# Patient Record
Sex: MALE
[De-identification: names, ages, dates, MRNs, and addresses within clinical notes are randomized per-mention and may not be internally consistent; named-entity substitution may affect disease eponyms.]

---

## 2018-01-23 ENCOUNTER — TRANSCRIPTION ENCOUNTER (OUTPATIENT)
Age: 33
End: 2018-01-23

## 2020-11-13 ENCOUNTER — APPOINTMENT (OUTPATIENT)
Dept: ORTHOPEDIC SURGERY | Facility: CLINIC | Age: 35
End: 2020-11-13
Payer: COMMERCIAL

## 2020-11-13 VITALS
SYSTOLIC BLOOD PRESSURE: 127 MMHG | BODY MASS INDEX: 29.39 KG/M2 | WEIGHT: 217 LBS | HEART RATE: 64 BPM | DIASTOLIC BLOOD PRESSURE: 86 MMHG | HEIGHT: 72 IN

## 2020-11-13 PROCEDURE — 20552 NJX 1/MLT TRIGGER POINT 1/2: CPT | Mod: LT

## 2020-11-13 PROCEDURE — 99072 ADDL SUPL MATRL&STAF TM PHE: CPT

## 2020-11-13 PROCEDURE — 72100 X-RAY EXAM L-S SPINE 2/3 VWS: CPT

## 2020-11-13 PROCEDURE — 99204 OFFICE O/P NEW MOD 45 MIN: CPT | Mod: 25

## 2020-11-13 NOTE — ADDENDUM
[FreeTextEntry1] : Documented by Bassam Arechiga acting as a scribe for Dr. Fredy Erwin on 11/13/2020. All medical record entries made by the Scribe were at my, Dr. Fredy Erwin, direction and personally dictated by me on 11/13/2020 . I have reviewed the chart and agree that the record accurately reflects my personal performance of the history, physical exam, assessment and plan. I have also personally directed, reviewed, and agreed with the chart.

## 2020-11-13 NOTE — HISTORY OF PRESENT ILLNESS
[de-identified] : 35 year M presents for an initial evaluation of one month of left lower lumbar pain that began when he started running with a weighted vest. The pain when running is described as a burning sensation. He has recently used a Medrol dose pack and states he is improving. Prior to taking the Medrol pack he notes the pain traveled down his legs and his toes were getting numb.  [Ataxia] : no ataxia [Incontinence] : no incontinence [Loss of Dexterity] : good dexterity [Urinary Ret.] : no urinary retention

## 2020-11-13 NOTE — PHYSICAL EXAM
[Poor Appearance] : well-appearing [Acute Distress] : not in acute distress [Obese] : not obese [de-identified] : CONSTITUTIONAL: The patient is a very pleasant individual who is well-nourished and who appears stated age.\par PSYCHIATRIC: The patient is alert and oriented X 3 and in no apparent distress, and participates with orthopedic evaluation well.\par HEAD: Atraumatic and is nonsyndromic in appearance.\par EENT: No visible thyromegaly, EOMI.\par RESPIRATORY: Respiratory rate is regular, not dyspneic on examination.\par LYMPHATICS: There is no inguinal lymphadenopathy\par INTEGUMENTARY: Skin is clean, dry, and intact about the bilateral lower extremities and lumbar spine.\par VASCULAR: There is brisk capillary refill about the bilateral lower extremities.\par NEUROLOGIC: There are no pathologic reflexes. There is no decrease in sensation of the bilateral lower extremities on Wartenberg pinwheel examination. Deep tendon reflexes are well maintained at 2+/4 of the bilateral lower extremities and are symmetric.\par MUSCULOSKELETAL: There is no visible muscular atrophy. Manual motor strength is well maintained in the bilateral lower extremities. Range of motion of lumbar spine is well maintained. The patient ambulates in a non-myelopathic manner. Negative tension sign and straight leg raise bilaterally. Quad extension, ankle dorsiflexion, EHL, plantar flexion, and ankle eversion are well preserved. Normal secondary orthopaedic exam of bilateral hips, greater trochanteric area, knees and ankles. Isolated left lower lumbar myositis / trigger point.  [de-identified] : Xray of the lumbar spine taken 11/13/2020 demonstrates focal L5-S1 lumbar degenerative disc disease, patient is S/p left sided L5-S1 Diskectomy done 03/25/2011.

## 2020-11-13 NOTE — DISCUSSION/SUMMARY
[de-identified] : Patient tolerated a trigger point injection well. I will provide a Medrol dose pack for pain relief. With regard to running I recommend lowering the milage and extensive stretching prior. We also spoke about the benefits of using heat, ice, and Bengay cream. The patient will follow up in 1 month for a repeat clinical assessment, If pain persists at this point patient can follow up as needed.

## 2020-11-13 NOTE — PROCEDURE
[de-identified] : Verbal consent was obtained and all questions, comments and concerns were addressed. After the left lower lumbar trigger point in the affected area was cleansed with alcohol prep X 3, ethyl chloride was used as local anesthetic. Under sterile precautions 1cc of 1% lidocaine without epinephrine, plus 10 mg depomedrol, were instilled into the affected left lower lumbar trigger points. Patient tolerated procedure well. Dry sterile dressing was placed and patient described relief of pain 5 minutes after procedure was performed.

## 2020-12-17 ENCOUNTER — APPOINTMENT (OUTPATIENT)
Dept: ORTHOPEDIC SURGERY | Facility: CLINIC | Age: 35
End: 2020-12-17
Payer: COMMERCIAL

## 2020-12-17 VITALS
BODY MASS INDEX: 29.39 KG/M2 | HEART RATE: 62 BPM | SYSTOLIC BLOOD PRESSURE: 126 MMHG | DIASTOLIC BLOOD PRESSURE: 78 MMHG | HEIGHT: 72 IN | WEIGHT: 217 LBS

## 2020-12-17 VITALS — TEMPERATURE: 96.9 F

## 2020-12-17 DIAGNOSIS — Z78.9 OTHER SPECIFIED HEALTH STATUS: ICD-10-CM

## 2020-12-17 PROCEDURE — 99214 OFFICE O/P EST MOD 30 MIN: CPT

## 2020-12-17 PROCEDURE — 99072 ADDL SUPL MATRL&STAF TM PHE: CPT

## 2020-12-17 NOTE — HISTORY OF PRESENT ILLNESS
[de-identified] : 35 year M presents for an initial evaluation of one month of left lower lumbar pain that began when he started running with a weighted vest. The pain when running is described as a burning sensation. He has recently used a Medrol dose pack and states he is improving. Prior to taking the Medrol pack he notes the pain traveled down his legs and his toes were getting numb. He has been Taking,doing a physician guided program for the last 4 weeks since his last visit on November 18, 2020. He is concerned that his pain across his low back and right buttock continues.
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
## 2020-12-17 NOTE — PHYSICAL EXAM
[de-identified] : CONSTITUTIONAL: The patient is a very pleasant individual who is well-nourished and who appears stated age.\par PSYCHIATRIC: The patient is alert and oriented X 3 and in no apparent distress, and participates with orthopedic evaluation well.\par HEAD: Atraumatic and is nonsyndromic in appearance.\par EENT: No visible thyromegaly, EOMI.\par RESPIRATORY: Respiratory rate is regular, not dyspneic on examination.\par LYMPHATICS: There is no inguinal lymphadenopathy\par INTEGUMENTARY: Skin is clean, dry, and intact about the bilateral lower extremities and lumbar spine.\par VASCULAR: There is brisk capillary refill about the bilateral lower extremities.\par NEUROLOGIC: There are no pathologic reflexes. There is no decrease in sensation of the bilateral lower extremities on Wartenberg pinwheel examination. Deep tendon reflexes are well maintained at 2+/4 of the bilateral lower extremities and are symmetric.\par MUSCULOSKELETAL: There is no visible muscular atrophy. Manual motor strength is well maintained in the bilateral lower extremities. Range of motion of lumbar spine is well maintained. The patient ambulates in a non-myelopathic manner. Negative tension sign and straight leg raise bilaterally. Quad extension, ankle dorsiflexion, EHL, plantar flexion, and ankle eversion are well preserved. Normal secondary orthopaedic exam of bilateral hips, greater trochanteric area, knees and ankles. Isolated left lower lumbar myositis / trigger point. \par  [de-identified] : no new imaging\par re reviewed lumbar xray last visit demonstrating DDD L5-S1

## 2021-01-02 ENCOUNTER — APPOINTMENT (OUTPATIENT)
Dept: MRI IMAGING | Facility: CLINIC | Age: 36
End: 2021-01-02
Payer: COMMERCIAL

## 2021-01-02 ENCOUNTER — OUTPATIENT (OUTPATIENT)
Dept: OUTPATIENT SERVICES | Facility: HOSPITAL | Age: 36
LOS: 1 days | End: 2021-01-02
Payer: COMMERCIAL

## 2021-01-02 DIAGNOSIS — M47.816 SPONDYLOSIS WITHOUT MYELOPATHY OR RADICULOPATHY, LUMBAR REGION: ICD-10-CM

## 2021-01-02 PROCEDURE — 72148 MRI LUMBAR SPINE W/O DYE: CPT

## 2021-01-02 PROCEDURE — 72148 MRI LUMBAR SPINE W/O DYE: CPT | Mod: 26

## 2021-01-12 ENCOUNTER — APPOINTMENT (OUTPATIENT)
Dept: ORTHOPEDIC SURGERY | Facility: CLINIC | Age: 36
End: 2021-01-12
Payer: COMMERCIAL

## 2021-01-12 VITALS
DIASTOLIC BLOOD PRESSURE: 78 MMHG | SYSTOLIC BLOOD PRESSURE: 126 MMHG | BODY MASS INDEX: 29.39 KG/M2 | HEART RATE: 66 BPM | WEIGHT: 217 LBS | HEIGHT: 72 IN

## 2021-01-12 DIAGNOSIS — M60.9 MYOSITIS, UNSPECIFIED: ICD-10-CM

## 2021-01-12 PROCEDURE — 99072 ADDL SUPL MATRL&STAF TM PHE: CPT

## 2021-01-12 PROCEDURE — 99214 OFFICE O/P EST MOD 30 MIN: CPT

## 2021-01-12 NOTE — PHYSICAL EXAM
[Poor Appearance] : well-appearing [Acute Distress] : not in acute distress [de-identified] : CONSTITUTIONAL: The patient is a very pleasant individual who is well-nourished and who appears stated age.\par PSYCHIATRIC: The patient is alert and oriented X 3 and in no apparent distress, and participates with orthopedic evaluation well.\par HEAD: Atraumatic and is nonsyndromic in appearance.\par EENT: No visible thyromegaly, EOMI.\par RESPIRATORY: Respiratory rate is regular, not dyspneic on examination.\par LYMPHATICS: There is no inguinal lymphadenopathy\par INTEGUMENTARY: Skin is clean, dry, and intact about the bilateral lower extremities and lumbar spine.\par VASCULAR: There is brisk capillary refill about the bilateral lower extremities.\par NEUROLOGIC: There are no pathologic reflexes. There is no decrease in sensation of the bilateral lower extremities on Wartenberg pinwheel examination. Deep tendon reflexes are well maintained at 2+/4 of the bilateral lower extremities and are symmetric.\par MUSCULOSKELETAL: There is no visible muscular atrophy. Manual motor strength is well maintained in the bilateral lower extremities. Range of motion of lumbar spine is well maintained. The patient ambulates in a non-myelopathic manner. Negative tension sign and straight leg raise bilaterally. Quad extension, ankle dorsiflexion, EHL, plantar flexion, and ankle eversion are well preserved. Normal secondary orthopaedic exam of bilateral hips, greater trochanteric area, knees and ankles. Isolated left lower lumbar myositis / trigger point. \par  [de-identified] : Lumbar MRI done in [place] while held radiology January 2021 demonstrates annulus tear L4-L5 with disc herniation paracentral to the left somewhat impinging the L5 nerve degenerative disc disease L5-S1.

## 2021-01-12 NOTE — HISTORY OF PRESENT ILLNESS
[de-identified] : 35 year M presents for a follow up evaluation of one month of left lower lumbar pain that began when he started running with a weighted vest. The pain when running is described as a burning sensation. He has recently used three Medrol dose pack and states he is no longer improving. Prior to taking the Medrol pack he notes the pain traveled down his left legs and his toes were getting numb. He has been Taking,doing a physician guided program for the last 4 weeks since his last visit on November 18, 2020. He is concerned that his pain across his low back and right buttock continues.  Here for MRI review. [Ataxia] : no ataxia [Incontinence] : no incontinence [Loss of Dexterity] : good dexterity [Urinary Ret.] : no urinary retention

## 2021-01-12 NOTE — DISCUSSION/SUMMARY
[de-identified] : Patient has done 3 Medrol Dosepaks without any effects I discouraged any further use of oral steroids as there can be side effects. He is referred to pain management/physiatry for injection therapy. We had a long discussion about the significance in course of degenerative disc disease in every opportunity was given to answer questions comments or concerns. Intermittent uses anti-inflammatories, ice, heat, topicals as needed.  Low impact aerobic activity and weight training was discussed at length as well. Followup  After one or 2 injections

## 2021-01-14 ENCOUNTER — APPOINTMENT (OUTPATIENT)
Dept: PHYSICAL MEDICINE AND REHAB | Facility: CLINIC | Age: 36
End: 2021-01-14
Payer: COMMERCIAL

## 2021-01-14 VITALS
SYSTOLIC BLOOD PRESSURE: 136 MMHG | HEIGHT: 72 IN | WEIGHT: 217 LBS | DIASTOLIC BLOOD PRESSURE: 87 MMHG | HEART RATE: 59 BPM | BODY MASS INDEX: 29.39 KG/M2

## 2021-01-14 VITALS — TEMPERATURE: 96.2 F

## 2021-01-14 DIAGNOSIS — M47.816 SPONDYLOSIS W/OUT MYELOPATHY OR RADICULOPATHY, LUMBAR REGION: ICD-10-CM

## 2021-01-14 PROCEDURE — 99072 ADDL SUPL MATRL&STAF TM PHE: CPT

## 2021-01-14 PROCEDURE — 99203 OFFICE O/P NEW LOW 30 MIN: CPT

## 2021-01-14 NOTE — HISTORY OF PRESENT ILLNESS
[FreeTextEntry1] : Mr. GUILLERMO FELTON is a very pleasant 35 year male PSH lumbar microdiscectomy L4/L5, L5/S1 in 2011 (good relief afterwards) who comes in for evaluation of LBP that has been ongoing for 2009 without any specific injury or inciting event. Had flare up in 2013 and 2016 and went away with medrlo dose pack. Patient's pain pain at that time described radicular pain to the right leg in 2013 and radicular pain to the left leg in 2016.Patient has had flare up again in 2020 which he described the pain more localized to the hip and more soreness, with radicular pain in the left leg. Patient denies anything on the right leg for now. The pain is located primarily in the low back, left hip, left posterior thigh, and left foot. Intermittent in nature. The pain is rated as  4-5/10 during today's visit, and ranges from 4-9/10. The patient's symptoms are aggravated by standing prolonged and running and alleviated by rest, CBD lotion after his last Medrol dose pack in [place] week (has had 3 medrol dose pack in the past year) . The patient endorses to occasional numbness and tingling on his left toes and lateral side of his foot. Denies any night pain, weakness, or bowel/bladder dysfunction. The patient has no other complaints at this time. Patient had PT in the past 2018 which he found to help temporarily. Also had chiropractic care and acupuncture in the past without much help. He has had epidural injections done 6 times total with the last one in 2013 with most recent ones not helping much. \par \par Patient saw Dr. Kidd recently where MRI image was reviewed showing L5/S1 DDD. Referred here today for epidural injection.

## 2021-01-14 NOTE — DATA REVIEWED
[MRI] : MRI [FreeTextEntry1] : MRI Lumbar spine 1/2/21:\par Moderate multilevel spondylosis most significant at L4-5 and L5-S1.

## 2021-01-14 NOTE — ASSESSMENT
[FreeTextEntry1] : 35 y.o. M w/ remote h/o L4-5 + L5-S1 MLD (2011) now with advanced lumbar DDD (worse L5-S1) w/ Modic II changes and chronic, intermittent left S1 radiculopathy w/ reflex loss.  New MRI L-spine w/o discussed in detail with patient.   Rx P.T. for modalities, gentle ROM, stretching and strengthening exercises.  Advised weight loss and walking in pool for core stability.  Discussed R/B/A to LESI but deferred as patient is at increased risk of cumulative steroid toxicity 2' recent medrol dose pack x3.  May take short courses of PO NSAIDs but cautioned on chronic use 2' GI/cardiac/renal toxicities.  RTC in 2-3 months.

## 2021-01-14 NOTE — REVIEW OF SYSTEMS
[Negative] : Psychiatric [FreeTextEntry9] : +LBP  [de-identified] : +Numbness and tingling on the left foot

## 2021-01-14 NOTE — PHYSICAL EXAM
[FreeTextEntry1] : LUMBAR EXAM\par GEN: AAOx3, NAD\par SKIN: No lesions noted \par STRENGTH: 5/5 bilateral hip flexors, knee extensors, knee flexors, ankle dorsiflexors, long toe extensors, ankle plantar flexors. \par TONE: Normal, No clonus.\par REFLEXES: 2+ symmetric patella, 2+ right achilles. Absent left ankle jerk \par SENS: Grossly intact to light touch bilateral lower extremities \par INSP: Spine alignment is midline, with no evidence of scoliosis. \par GAIT: Non antalgic, normal reciprocating heel to toe \par LUMBAR ROM: 80 degree Flexion, extension 20', side-bending, rotation all full and pain free. \par HIP ROM: Full and pain free bilaterally \par PALP: There is no tenderness over the midline spinous processes, paravertebral muscles, SIJ, or greater trochanters bilaterally. \par SPECIAL: SLR test negative bilaterally. FADIR, JUDY negative bilaterally.\par \par

## 2021-01-26 ENCOUNTER — TRANSCRIPTION ENCOUNTER (OUTPATIENT)
Age: 36
End: 2021-01-26

## 2021-03-16 ENCOUNTER — APPOINTMENT (OUTPATIENT)
Dept: PHYSICAL MEDICINE AND REHAB | Facility: CLINIC | Age: 36
End: 2021-03-16
Payer: COMMERCIAL

## 2021-03-16 VITALS
DIASTOLIC BLOOD PRESSURE: 85 MMHG | WEIGHT: 225 LBS | RESPIRATION RATE: 14 BRPM | HEART RATE: 51 BPM | BODY MASS INDEX: 30.48 KG/M2 | OXYGEN SATURATION: 100 % | SYSTOLIC BLOOD PRESSURE: 129 MMHG | TEMPERATURE: 97 F | HEIGHT: 72 IN

## 2021-03-16 PROCEDURE — 99072 ADDL SUPL MATRL&STAF TM PHE: CPT

## 2021-03-16 PROCEDURE — 99212 OFFICE O/P EST SF 10 MIN: CPT

## 2021-03-16 RX ORDER — DICLOFENAC SODIUM 75 MG/1
75 TABLET, DELAYED RELEASE ORAL
Refills: 0 | Status: DISCONTINUED | COMMUNITY
End: 2021-03-16

## 2021-03-16 RX ORDER — METHYLPREDNISOLONE 4 MG/1
4 TABLET ORAL
Refills: 0 | Status: DISCONTINUED | COMMUNITY
End: 2021-03-16

## 2021-03-16 RX ORDER — DICLOFENAC SODIUM 75 MG/1
75 TABLET, DELAYED RELEASE ORAL
Qty: 30 | Refills: 0 | Status: DISCONTINUED | COMMUNITY
Start: 2020-12-11 | End: 2021-03-16

## 2021-03-16 RX ORDER — METHYLPREDNISOLONE 4 MG/1
4 TABLET ORAL
Qty: 1 | Refills: 0 | Status: DISCONTINUED | COMMUNITY
Start: 2020-11-13 | End: 2021-03-16

## 2021-03-16 NOTE — ASSESSMENT
[FreeTextEntry1] : 35 y.o. M w/ remote h/o L4-5 + L5-S1 MLD (2011) now with advanced lumbar DDD (worse L5-S1) w/ Modic II changes and chronic, intermittent left S1 radiculopathy w/ reflex loss - doing well clinically.   No need for lumbar spinal intervention at this point.   Reviewed proper HEP and counseled patient on proper running technique.  RTC 6 months or sooner should need arise.

## 2021-03-16 NOTE — PHYSICAL EXAM
[FreeTextEntry1] : LUMBAR EXAM\par GEN: AAOx3, NAD\par No significant change in today's examination\par STRENGTH: 5/5 bilateral hip flexors, knee extensors, knee flexors, ankle dorsiflexors, long toe extensors, ankle plantar flexors. \par TONE: Normal, No clonus.\par REFLEXES: 2+ symmetric patella, 2+ right Achilles. Absent left ankle jerk (static)\par SENS: Grossly intact to light touch bilateral lower extremities \par INSP: Spine alignment is midline, with no evidence of scoliosis. \par GAIT: Non antalgic, normal reciprocating heel to toe \par LUMBAR ROM: 80 degree Flexion, extension 20', side-bending, rotation all full and pain free. \par HIP ROM: Full and pain free bilaterally \par PALP: There is no tenderness over the midline spinous processes, paravertebral muscles, SIJ, or greater trochanters bilaterally. \par SPECIAL: SLR test negative bilaterally. FADIR, JUDY negative bilaterally.\par \par

## 2021-03-16 NOTE — HISTORY OF PRESENT ILLNESS
[FreeTextEntry1] : 35 y.o. M w/ remote h/o L4-5 + L5-S1 MLD (2011) now with advanced lumbar DDD (worse L5-S1) w/ Modic II changes and chronic, intermittent left S1 radiculopathy w/ reflex loss returns to office for f/u.  Pt. states that his LBP is "doing a lot better" since starting P.T..  Pain down left leg "almost down to zero".  Reports occasional left lateral hip/buttock pain which is responsive to stretching.  Pt. is compliant with HEP.  Not able to exercise in pool but started stationary bike and running.  No NSAIDs.

## 2021-09-07 ENCOUNTER — APPOINTMENT (OUTPATIENT)
Dept: PHYSICAL MEDICINE AND REHAB | Facility: CLINIC | Age: 36
End: 2021-09-07

## 2021-12-07 ENCOUNTER — APPOINTMENT (OUTPATIENT)
Dept: PHYSICAL MEDICINE AND REHAB | Facility: CLINIC | Age: 36
End: 2021-12-07
Payer: COMMERCIAL

## 2021-12-07 DIAGNOSIS — M51.36 OTHER INTERVERTEBRAL DISC DEGENERATION, LUMBAR REGION: ICD-10-CM

## 2021-12-07 DIAGNOSIS — Z98.890 OTHER SPECIFIED POSTPROCEDURAL STATES: ICD-10-CM

## 2021-12-07 PROCEDURE — 99442: CPT

## 2021-12-07 NOTE — HISTORY OF PRESENT ILLNESS
[FreeTextEntry1] : 36 y.o. M w/ remote h/o L4-5 + L5-S1 MLD (2011) now with advanced lumbar DDD (worse L5-S1) w/ Modic II changes and chronic, intermittent left S1 radiculopathy w/ reflex loss for telephonic f/u.  Since last office visit (3/16/21) pt. has moved to [place], [place].  Pt. states that his sciatica has responded well to P.T. in past.  Pt. is now c/o pain in both Achilles tendons since he started running more.  Pain is worse first thing in AM.  Gets better throughout day.  Denies N/T/B in feet.  Not taking any NSAIDs.  No injections.

## 2021-12-07 NOTE — REASON FOR VISIT
[Home] : at home, [unfilled] , at the time of the visit. [Medical Office: ([facility])___] : at the medical office located in  [Verbal consent obtained from patient] : the patient, [unfilled] [Follow-Up] : a follow-up visit

## 2021-12-07 NOTE — ASSESSMENT
[FreeTextEntry1] : 36 y.o. M w/ remote h/o L4-5 + L5-S1 MLD (2011) now with advanced lumbar DDD (worse L5-S1) w/ Modic II changes and chronic, intermittent left S1 radiculopathy w/ reflex loss - doing well clinically but w/ recent onset of b/l Achilles tendon pain.  I spent most of today's telephone visit (20 min) discussing pathogenesis and further non-operative management.  May use Voltaren gel 2-4 g to affected areas bid - tid prn.  Rx P.T. for modalities, gentle ROM, stretching and strengthening exercises.  Will perform diagnostic MSK US examination at next office visit.  No need for lumbar spinal intervention at this point.  Reviewed proper HEP and counseled patient on proper running technique.  RTC 1-2 weeks for US.

## 2022-01-07 ENCOUNTER — APPOINTMENT (OUTPATIENT)
Dept: PHYSICAL MEDICINE AND REHAB | Facility: CLINIC | Age: 37
End: 2022-01-07

## 2023-03-22 ENCOUNTER — APPOINTMENT (RX ONLY)
Dept: URBAN - METROPOLITAN AREA CLINIC 81 | Facility: CLINIC | Age: 38
Setting detail: DERMATOLOGY
End: 2023-03-22

## 2023-03-22 DIAGNOSIS — L82.0 INFLAMED SEBORRHEIC KERATOSIS: ICD-10-CM

## 2023-03-22 PROBLEM — D23.121 OTHER BENIGN NEOPLASM OF SKIN OF LEFT UPPER EYELID, INCLUDING CANTHUS: Status: ACTIVE | Noted: 2023-03-22

## 2023-03-22 PROBLEM — D23.111 OTHER BENIGN NEOPLASM OF SKIN OF RIGHT UPPER EYELID, INCLUDING CANTHUS: Status: ACTIVE | Noted: 2023-03-22

## 2023-03-22 PROCEDURE — ? LIQUID NITROGEN

## 2023-03-22 PROCEDURE — ? TREATMENT REGIMEN

## 2023-03-22 PROCEDURE — 99202 OFFICE O/P NEW SF 15 MIN: CPT | Mod: 25

## 2023-03-22 PROCEDURE — 17110 DESTRUCTION B9 LES UP TO 14: CPT

## 2023-03-22 PROCEDURE — ? COUNSELING

## 2023-03-22 ASSESSMENT — LOCATION DETAILED DESCRIPTION DERM
LOCATION DETAILED: LEFT SUPERIOR CENTRAL MALAR CHEEK
LOCATION DETAILED: LEFT SUPERIOR MEDIAL MALAR CHEEK

## 2023-03-22 ASSESSMENT — LOCATION ZONE DERM: LOCATION ZONE: FACE

## 2023-03-22 ASSESSMENT — LOCATION SIMPLE DESCRIPTION DERM: LOCATION SIMPLE: LEFT CHEEK

## 2023-03-22 NOTE — PROCEDURE: LIQUID NITROGEN
Spray Paint Text: The liquid nitrogen was applied to the skin utilizing a spray paint frosting technique.
Post-Care Instructions: I reviewed with the patient in detail post-care instructions. Patient is to wear sunprotection, and avoid picking at any of the treated lesions. Pt may apply Vaseline to crusted or scabbing areas.
Show Spray Paint Technique Variable?: Yes
Consent: The patient's consent was obtained including but not limited to risks of crusting, scabbing, blistering, scarring, darker or lighter pigmentary change, recurrence, incomplete removal and infection.
Add 52 Modifier (Optional): no
Medical Necessity Information: It is in your best interest to select a reason for this procedure from the list below. All of these items fulfill various CMS LCD requirements except the new and changing color options.
Medical Necessity Clause: This procedure was medically necessary because the lesions that were treated were:
Number Of Freeze-Thaw Cycles: 2 freeze-thaw cycles
Detail Level: Detailed